# Patient Record
Sex: MALE | Race: WHITE | NOT HISPANIC OR LATINO | URBAN - METROPOLITAN AREA
[De-identification: names, ages, dates, MRNs, and addresses within clinical notes are randomized per-mention and may not be internally consistent; named-entity substitution may affect disease eponyms.]

---

## 2019-06-27 ENCOUNTER — OFFICE (OUTPATIENT)
Dept: URBAN - METROPOLITAN AREA CLINIC 78 | Facility: CLINIC | Age: 84
End: 2019-06-27
Payer: MEDICARE

## 2019-06-27 VITALS
TEMPERATURE: 98.3 F | WEIGHT: 191 LBS | HEIGHT: 68 IN | SYSTOLIC BLOOD PRESSURE: 113 MMHG | DIASTOLIC BLOOD PRESSURE: 35 MMHG | HEART RATE: 62 BPM

## 2019-06-27 DIAGNOSIS — G45.9 TRANSIENT CEREBRAL ISCHEMIC ATTACK, UNSPECIFIED: ICD-10-CM

## 2019-06-27 DIAGNOSIS — Z86.010 PERSONAL HISTORY OF COLONIC POLYPS: ICD-10-CM

## 2019-06-27 PROCEDURE — 99202 OFFICE O/P NEW SF 15 MIN: CPT

## 2019-06-27 NOTE — SERVICEHPINOTES
SINDY GONZALEZ   is a   85   year old male who is being seen in consultation at the request of   CONNOR MUSTAFA   for h/o colon polyps. His last colonoscopy was in 2013 with an adenomatous polyp and 2 hyperplastic polyps. He otherwise had a colonoscopy by our practice in 2001 showing 2 hyperplastic polyps. He thinks he had another one circa 2008 ago but says it was done elsewhere. He denies abdominal pain, change in bowel habits, blood in stools, or other concerns. He exercises regularly and eats a healthy diet and stays very active. 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 Otherwise no prior cardiac diagnoses.